# Patient Record
Sex: FEMALE | Race: WHITE
[De-identification: names, ages, dates, MRNs, and addresses within clinical notes are randomized per-mention and may not be internally consistent; named-entity substitution may affect disease eponyms.]

---

## 2017-04-06 PROBLEM — Z00.00 ENCOUNTER FOR PREVENTIVE HEALTH EXAMINATION: Status: ACTIVE | Noted: 2017-04-06

## 2019-02-25 ENCOUNTER — RECORD ABSTRACTING (OUTPATIENT)
Age: 84
End: 2019-02-25

## 2019-02-25 DIAGNOSIS — Z87.898 PERSONAL HISTORY OF OTHER SPECIFIED CONDITIONS: ICD-10-CM

## 2019-02-25 DIAGNOSIS — Z80.9 FAMILY HISTORY OF MALIGNANT NEOPLASM, UNSPECIFIED: ICD-10-CM

## 2019-02-25 DIAGNOSIS — E21.3 HYPERPARATHYROIDISM, UNSPECIFIED: ICD-10-CM

## 2019-02-25 DIAGNOSIS — M25.569 PAIN IN UNSPECIFIED KNEE: ICD-10-CM

## 2019-02-25 DIAGNOSIS — Z87.39 PERSONAL HISTORY OF OTHER DISEASES OF THE MUSCULOSKELETAL SYSTEM AND CONNECTIVE TISSUE: ICD-10-CM

## 2019-02-25 DIAGNOSIS — E55.9 VITAMIN D DEFICIENCY, UNSPECIFIED: ICD-10-CM

## 2019-02-25 DIAGNOSIS — M25.559 PAIN IN UNSPECIFIED HIP: ICD-10-CM

## 2019-02-25 DIAGNOSIS — Z83.3 FAMILY HISTORY OF DIABETES MELLITUS: ICD-10-CM

## 2019-02-25 DIAGNOSIS — I10 ESSENTIAL (PRIMARY) HYPERTENSION: ICD-10-CM

## 2019-02-25 DIAGNOSIS — Z86.69 PERSONAL HISTORY OF OTHER DISEASES OF THE NERVOUS SYSTEM AND SENSE ORGANS: ICD-10-CM

## 2019-02-25 RX ORDER — TRIAMTERENE AND HYDROCHLOROTHIAZIDE 25; 37.5 MG/1; MG/1
37.5-25 TABLET ORAL DAILY
Refills: 0 | Status: ACTIVE | COMMUNITY

## 2019-03-12 ENCOUNTER — RX RENEWAL (OUTPATIENT)
Age: 84
End: 2019-03-12

## 2019-03-13 ENCOUNTER — MEDICATION RENEWAL (OUTPATIENT)
Age: 84
End: 2019-03-13

## 2019-03-13 ENCOUNTER — APPOINTMENT (OUTPATIENT)
Dept: ENDOCRINOLOGY | Facility: CLINIC | Age: 84
End: 2019-03-13
Payer: MEDICARE

## 2019-03-13 VITALS
DIASTOLIC BLOOD PRESSURE: 72 MMHG | BODY MASS INDEX: 23.03 KG/M2 | WEIGHT: 122 LBS | HEIGHT: 61 IN | HEART RATE: 70 BPM | SYSTOLIC BLOOD PRESSURE: 130 MMHG

## 2019-03-13 PROCEDURE — 99213 OFFICE O/P EST LOW 20 MIN: CPT
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
## 2019-07-17 ENCOUNTER — APPOINTMENT (OUTPATIENT)
Dept: ENDOCRINOLOGY | Facility: CLINIC | Age: 84
End: 2019-07-17
Payer: MEDICARE

## 2019-07-17 VITALS
HEIGHT: 61 IN | WEIGHT: 125 LBS | DIASTOLIC BLOOD PRESSURE: 68 MMHG | SYSTOLIC BLOOD PRESSURE: 120 MMHG | BODY MASS INDEX: 23.6 KG/M2 | HEART RATE: 95 BPM

## 2019-07-17 PROCEDURE — 99213 OFFICE O/P EST LOW 20 MIN: CPT

## 2019-07-17 NOTE — ASSESSMENT
[FreeTextEntry1] : ~\par A1c a bit high, even for 89 yo \par Same Rx for now.\par ROV after she next sees Dr. Palacios.
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
## 2019-11-14 ENCOUNTER — APPOINTMENT (OUTPATIENT)
Dept: ENDOCRINOLOGY | Facility: CLINIC | Age: 84
End: 2019-11-14
Payer: MEDICARE

## 2019-11-14 VITALS
SYSTOLIC BLOOD PRESSURE: 138 MMHG | DIASTOLIC BLOOD PRESSURE: 60 MMHG | WEIGHT: 124 LBS | HEIGHT: 61 IN | BODY MASS INDEX: 23.41 KG/M2 | HEART RATE: 86 BPM

## 2019-11-14 PROCEDURE — 99214 OFFICE O/P EST MOD 30 MIN: CPT

## 2019-11-14 NOTE — PHYSICAL EXAM
[Alert] : alert [No Acute Distress] : no acute distress [Well Nourished] : well nourished [Well Developed] : well developed [Normal Sclera/Conjunctiva] : normal sclera/conjunctiva [EOMI] : extra ocular movement intact [Normal Oropharynx] : the oropharynx was normal [No Proptosis] : no proptosis [No Thyroid Nodules] : there were no palpable thyroid nodules [Thyroid Not Enlarged] : the thyroid was not enlarged [No Accessory Muscle Use] : no accessory muscle use [No Respiratory Distress] : no respiratory distress [Normal Rate] : heart rate was normal  [Pedal Pulses Normal] : the pedal pulses are present [No Edema] : there was no peripheral edema [Not Distended] : not distended [No Stigmata of Cushings Syndrome] : no stigmata of cushings syndrome [Normal Gait] : normal gait [No Rash] : no rash [No Tremors] : no tremors [Normal Insight/Judgement] : insight and judgment were intact [Oriented x3] : oriented to person, place, and time [Normal Mood] : the mood was normal [Normal Affect] : the affect was normal [Acanthosis Nigricans] : no acanthosis nigricans

## 2019-11-14 NOTE — ASSESSMENT
[FreeTextEntry1] : ~\par ADA Guidelines are looser for the very elderly, e.g., 94 yo\par She is doing weill on BID glkipizde ER pluus repaglinide.
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
## 2020-04-01 ENCOUNTER — APPOINTMENT (OUTPATIENT)
Dept: ENDOCRINOLOGY | Facility: CLINIC | Age: 85
End: 2020-04-01
Payer: MEDICARE

## 2020-04-01 PROCEDURE — 99443: CPT
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
## 2020-09-02 ENCOUNTER — APPOINTMENT (OUTPATIENT)
Dept: ENDOCRINOLOGY | Facility: CLINIC | Age: 85
End: 2020-09-02
Payer: MEDICARE

## 2020-09-02 PROCEDURE — 99214 OFFICE O/P EST MOD 30 MIN: CPT | Mod: 95
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
## 2021-01-06 ENCOUNTER — APPOINTMENT (OUTPATIENT)
Dept: ENDOCRINOLOGY | Facility: CLINIC | Age: 86
End: 2021-01-06
Payer: MEDICARE

## 2021-01-06 PROCEDURE — 99443: CPT | Mod: 95

## 2021-01-08 NOTE — ASSESSMENT
[FreeTextEntry1] : Doing well.\par Prandin longer covered.\par Will switch to [employer] \par ROV 3 months
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
## 2021-04-07 ENCOUNTER — APPOINTMENT (OUTPATIENT)
Dept: ENDOCRINOLOGY | Facility: CLINIC | Age: 86
End: 2021-04-07
Payer: MEDICARE

## 2021-04-07 PROCEDURE — 99213 OFFICE O/P EST LOW 20 MIN: CPT

## 2021-04-09 NOTE — ASSESSMENT
[FreeTextEntry1] : 89 yo with poorly controlled diabetes.\par She is well aware of strategies that would improve blood sugar control..
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
## 2021-07-27 ENCOUNTER — APPOINTMENT (OUTPATIENT)
Dept: ENDOCRINOLOGY | Facility: CLINIC | Age: 86
End: 2021-07-27
Payer: MEDICARE

## 2021-07-27 PROCEDURE — 99213 OFFICE O/P EST LOW 20 MIN: CPT
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
## 2021-11-23 ENCOUNTER — APPOINTMENT (OUTPATIENT)
Dept: ENDOCRINOLOGY | Facility: CLINIC | Age: 86
End: 2021-11-23
Payer: MEDICARE

## 2021-11-23 PROCEDURE — 99443: CPT | Mod: 95
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
## 2022-04-06 ENCOUNTER — APPOINTMENT (OUTPATIENT)
Dept: ENDOCRINOLOGY | Facility: CLINIC | Age: 87
End: 2022-04-06
Payer: MEDICARE

## 2022-04-06 PROCEDURE — 99443: CPT | Mod: 95

## 2022-04-07 NOTE — HISTORY OF PRESENT ILLNESS
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            No hypoglyceemia - lowest in 90's.\par            No longer needs a cane.\par            .\par            Remains on Januvia, 1/2 of a 100 mg pill.\par            Glipizide ER 2.5 mg in PM.\par            .\par            Impression: Diabetes is under acceptable control on current Rx and attention to diet. No hypoglycemia. \par            Plan: Same strategy. \par            .\par            ROV six months..\par \par \par

## 2022-08-05 ENCOUNTER — APPOINTMENT (OUTPATIENT)
Dept: ENDOCRINOLOGY | Facility: CLINIC | Age: 87
End: 2022-08-05

## 2022-08-05 PROCEDURE — 99443: CPT | Mod: 95

## 2022-08-05 RX ORDER — REPAGLINIDE 1 MG/1
1 TABLET ORAL
Qty: 540 | Refills: 3 | Status: ACTIVE | COMMUNITY
Start: 1900-01-01 | End: 1900-01-01

## 2022-12-01 NOTE — HISTORY OF PRESENT ILLNESS
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            Impression: Doing very well. \par            .\par            Plan: Same Rx. \par            .\par            ==\par            Nov 3, 2014\par            PCP: Dr. Ignacio Chandler\par             Back: Dr. Peguero\par             Eyes: Dr. SANA Wolf (yearly)\par             Pod: Dr. Lowe (every 3 months) \par            .\par            CC: Diabetes \par             Januvia, 1/2 of a 100 mg pill.\par            Glipizide ER 2.5 mg in PM, but sometimes takes more (if after injection into spine).\par            .\par            Brings fingerstick BS today that show FBS always good \par            .\par            Impression: Diabetes under acceptable control.\par            She finds that large lunches generate highter sugars. She likes to take 2-3 extra glipizides if her BS is then over 200 by dinner.\par            .\par            Plan: Updated A1c and TSH\par            ROV in April.\par            .\par            ===\par            May 5, 2014\par            PCP: Dr. Ignacio Chandler\par             Feet: Dr. Lowe\par             Eyes: Dr. Wolf\par            CC: DM2; (back surgery last year, slightly low TSH)\par            .\par            Recent HbA1c at [facility] was 7.6% \par            Fingerstick BS generally in good range, particularly FBS.\par            No hypoglyceemia - lowest in 90's.\par            No longer needs a cane.\par            .\par            Remains on Januvia, 1/2 of a 100 mg pill.\par            Glipizide ER 2.5 mg in PM.\par            .\par            Impression: Diabetes is under acceptable control on current Rx and attention to diet. No hypoglycemia. \par            Plan: Same strategy. \par            .\par            ROV six months..\par \par \par

## 2022-12-04 ENCOUNTER — NON-APPOINTMENT (OUTPATIENT)
Age: 87
End: 2022-12-04

## 2022-12-04 RX ORDER — BLOOD SUGAR DIAGNOSTIC
STRIP MISCELLANEOUS TWICE DAILY
Qty: 200 | Refills: 3 | Status: ACTIVE | COMMUNITY
Start: 2022-11-30 | End: 1900-01-01

## 2023-01-18 ENCOUNTER — APPOINTMENT (OUTPATIENT)
Dept: ENDOCRINOLOGY | Facility: CLINIC | Age: 88
End: 2023-01-18
Payer: MEDICARE

## 2023-01-18 PROCEDURE — 99443: CPT | Mod: 95

## 2023-01-19 NOTE — HISTORY OF PRESENT ILLNESS
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            HbA1c 7.8 %\par            urine microalbumin ratio 9.6\par            .\par            For diabetes, takes:\par             Januvia, 1/2 of a 100 mg pill.-> 50 mg daily at [facility]\par            .\par            Glipizide ER 2.5 mg in PM, but sometimes takes more \par            .\par            Tests fingerstick BS every day.\par            Went to Vt to [employer] and maple syrup [employer]y and BS went to 347 mg/dl\par            .\par            Last night watched the lunar eclipse.\par            .\par            Impression: Slightly elevated calcium.\par            .\par            Plan: I defer to Dr. Chandler. Updated calcium today and\par            ROV six months. \par            .\par            ==\par            .\par            March 30, 2015\par            .\par            PCP: Dr. Ignacio Chandler\par             Back: Dr. Peguero\par             Eyes: Dr. SANA Wolf (yearly)\par             Pod: Dr. Lowe (every 3 months) \par            .\par            CC: Diabetes (~2008)\par            .\par            Medications for ciabetes.\par             Januvia, 1/2 of a 100 mg pill.\par            .\par            Glipizide ER 2.5 mg in PM, but sometimes takes more (if after injection into spine). \par            .\par            She varies the glipizide ER from 1 - 3 pills depending on high high BS is and how much ice cream she plans to eat.\par            .\par            November HbA1c was 7.7% \par            .\par            Impression: Doing very well. \par            .\par            Plan: Same Rx. \par            .\par            ==\par            Nov 3, 2014\par            PCP: Dr. Ignacio Chandler\par             Back: Dr. Peguero\par             Eyes: Dr. SANA Wolf (yearly)\par             Pod: Dr. Lowe (every 3 months) \par            .\par            CC: Diabetes \par             Januvia, 1/2 of a 100 mg pill.\par            Glipizide ER 2.5 mg in PM, but sometimes takes more (if after injection into spine).\par            .\par            Brings fingerstick BS today that show FBS always good \par            .\par            Impression: Diabetes under acceptable control.\par            She finds that large lunches generate highter sugars. She likes to take 2-3 extra glipizides if her BS is then over 200 by dinner.\par            .\par            Plan: Updated A1c and TSH\par            ROV in April.\par            .\par            ===\par            May 5, 2014\par            PCP: Dr. Ignacio Chandler\par             Feet: Dr. Lowe\par             Eyes: Dr. Wolf\par            CC: DM2; (back surgery last year, slightly low TSH)\par            .\par            Recent HbA1c at [facility] was 7.6% \par            Fingerstick BS generally in good range, particularly FBS.\par            No hypoglyceemia - lowest in 90's.\par            No longer needs a cane.\par            .\par            Remains on Januvia, 1/2 of a 100 mg pill.\par            Glipizide ER 2.5 mg in PM.\par            .\par            Impression: Diabetes is under acceptable control on current Rx and attention to diet. No hypoglycemia. \par            Plan: Same strategy. \par            .\par            ROV six months..\par \par \par

## 2023-02-13 RX ORDER — GLIPIZIDE 2.5 MG/1
2.5 TABLET, FILM COATED, EXTENDED RELEASE ORAL
Qty: 360 | Refills: 3 | Status: ACTIVE | COMMUNITY
Start: 2019-03-12 | End: 1900-01-01

## 2023-03-08 ENCOUNTER — APPOINTMENT (OUTPATIENT)
Dept: ENDOCRINOLOGY | Facility: CLINIC | Age: 88
End: 2023-03-08
Payer: MEDICARE

## 2023-03-08 VITALS
HEART RATE: 78 BPM | BODY MASS INDEX: 21.34 KG/M2 | SYSTOLIC BLOOD PRESSURE: 136 MMHG | HEIGHT: 61 IN | DIASTOLIC BLOOD PRESSURE: 82 MMHG | WEIGHT: 113 LBS | OXYGEN SATURATION: 98 %

## 2023-03-08 DIAGNOSIS — E11.9 TYPE 2 DIABETES MELLITUS W/OUT COMPLICATIONS: ICD-10-CM

## 2023-03-08 PROCEDURE — 99214 OFFICE O/P EST MOD 30 MIN: CPT

## 2023-03-08 NOTE — HISTORY OF PRESENT ILLNESS
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 Chandler\par             Back: Dr. Marielena de la paz two yrs ago at [facility]\par             Eyes: Dr. SANA Wolf (yearly)\par             Pod: Dr. Lowe (every 3 months) \par            .\par            CC: Diabetes (~2008)\par             (? elevated cacium - on HCTZ)\par            .\par            Remains on \par            Januvia 50 mcg\par            glipizide ER 2.5 one - two a day\par            [facility] on [facility] in [place]\par            .\par            After New Years, had some episodes of bronchospasm. Treated with antibiotics, steroids. BS as high as 335 mg/dl (without symptoms) \par            Now  mg/dl \par            .\par            Likes diet cranberry juice (thinks it is better than OJ as she says it has less sugar). \par            .\par            Impression: Continues to carefully monitor fingerstick BS.\par            Doing excellent job.\par            .\par            Plan: Same Rx. -jh\par            .\par            ==\par            .\par            Sept 28, 2015\par            .\par            PCP: Dr. Ignacio Chandler\par             Back: Dr. Marielena de la paz two yrs ago at [facility]\par             Eyes: Dr. SANA Wolf (yearly)\par             Pod: Dr. Lowe (every 3 months) \par            .\par            CC: Diabetes (~2008)\par             (? elevated cacium - on HCTZ)\par            .\par            89 yo with diabetes since about 2008.\par            Note from March appended below.\par            .\par            Labs at [place] 3/30 included  mg/dl\par            25 OH vit D 48\par            calcium 10.3 (8.4-10.2) ****\par            A1c 8.0%\par            .\par            8/5/15 labs by Dr. Chandler at [facility] included\par            143 mg/dl fasting\par            BUn 22\par            creat 0.9\par            calcium 10.3 (8.4 -10.2) \par            total bili 1.1 \par            TSH 0.72\par            HbA1c 7.8 %\par            urine microalbumin ratio 9.6\par            .\par            For diabetes, takes:\par             Januvia, 1/2 of a 100 mg pill.-> 50 mg daily at [facility]\par            .\par            Glipizide ER 2.5 mg in PM, but sometimes takes more \par            .\par            Tests fingerstick BS every day.\par            Went to [employer] and BS went to 347 mg/dl\par            .\par            Last night watched the lunar eclipse.\par            .\par            Impression: Slightly elevated calcium.\par            .\par            Plan: I defer to Dr. Chandler. Updated calcium today and\par            ROV six months. \par            .\par            ==\par            .\par            March 30, 2015\par            .\par            PCP: Dr. Ignacio Chandler\par             Back: Dr. Peguero\par             Eyes: Dr. SANA Wolf (yearly)\par             Pod: Dr. Lowe (every 3 months) \par            .\par            CC: Diabetes (~2008)\par            .\par            Medications for ciabetes.\par             Januvia, 1/2 of a 100 mg pill.\par            .\par            Glipizide ER 2.5 mg in PM, but sometimes takes more (if after injection into spine). \par            .\par            She varies the glipizide ER from 1 - 3 pills depending on high high BS is and how much ice cream she plans to eat.\par            .\par            November HbA1c was 7.7% \par            .\par            Impression: Doing very well. \par            .\par            Plan: Same Rx. \par            .\par            ==\par            Nov 3, 2014\par            PCP: Dr. Ignacio Chandler\par             Back: Dr. Peguero\par             Eyes: Dr. SANA Wolf (yearly)\par             Pod: Dr. Lowe (every 3 months) \par            .\par            CC: Diabetes \par             Januvia, 1/2 of a 100 mg pill.\par            Glipizide ER 2.5 mg in PM, but sometimes takes more (if after injection into spine).\par            .\par            Brings fingerstick BS today that show FBS always good \par            .\par            Impression: Diabetes under acceptable control.\par            She finds that large lunches generate highter sugars. She likes to take 2-3 extra glipizides if her BS is then over 200 by dinner.\par            .\par            Plan: Updated A1c and TSH\par            ROV in April.\par            .\par            ===\par            May 5, 2014\par            PCP: Dr. Ignacio Chandler\par             Feet: Dr. Lowe\par             Eyes: Dr. Wolf\par            CC: DM2; (back surgery last year, slightly low TSH)\par            .\par            Recent HbA1c at [facility] was 7.6% \par            Fingerstick BS generally in good range, particularly FBS.\par            No hypoglyceemia - lowest in 90's.\par            No longer needs a cane.\par            .\par            Remains on Januvia, 1/2 of a 100 mg pill.\par            Glipizide ER 2.5 mg in PM.\par            .\par            Impression: Diabetes is under acceptable control on current Rx and attention to diet. No hypoglycemia. \par            Plan: Same strategy. \par            .\par            ROV six months..\par \par \par

## 2023-10-17 ENCOUNTER — APPOINTMENT (OUTPATIENT)
Dept: ENDOCRINOLOGY | Facility: CLINIC | Age: 88
End: 2023-10-17